# Patient Record
(demographics unavailable — no encounter records)

---

## 2022-08-21 NOTE — EDPHYS
Physician Documentation                                                                           

 [facility]                                                                 

Name: Natalie Guerrero                                                                                

Age: 66 yrs                                                                                       

Sex: Female                                                                                       

: 1955                                                                                   

MRN: [id number]                                                                                   

Arrival Date: 2022                                                                          

Time: 20:25                                                                                       

Account#: [id number]                                                                            

Bed 19                                                                                            

Private MD:                                                                                       

ED Physician Goyo Cardenas                                                                      

HPI:                                                                                              

                                                                                             

21:14 This 66 yrs old  Female presents to ER via Ambulatory with complaints of Nose  pj 

      Bleed.                                                                                      

21:14 The patient presents with a nose bleed, that is apparently anterior, from the right     pj 

      nare, occurred from an unknown cause, that is moderate amount causative factors             

      include: unknown. Onset: The symptoms/episode began/occurred just prior to arrival.         

      Modifying factors: The symptoms are alleviated by nothing. the symptoms are aggravated      

      by nothing. Associated signs and symptoms: The patient has no apparent associated signs     

      or symptoms. Severity of symptoms: At their worst the symptoms were moderate in the         

      emergency department the symptoms are unchanged. The patient has not experienced            

      similar symptoms in the past.                                                               

                                                                                                  

Historical:                                                                                       

- Allergies:                                                                                      

20:40 No Known Allergies;                                                                     bm7 

- PMHx:                                                                                           

20:40 Asthma; Hypertensive disorder; hiatal hernia;                                           bm7 

- PSHx:                                                                                           

20:40 None;                                                                                   bm7 

                                                                                                  

- Immunization history:: Adult Immunizations up to date, Client reports receiving the             

  2nd dose of the Covid vaccine, Client reports receiving the 1st dose of the Covid               

  vaccine.                                                                                        

- Social history:: Smoking status: Patient denies any tobacco usage or history of.                

                                                                                                  

                                                                                                  

ROS:                                                                                              

21:15 Constitutional: Negative for fever, chills, and weight loss, Eyes: Negative for injury, pj 

      pain, redness, and discharge, Neck: Negative for injury, pain, and swelling,                

      Cardiovascular: Negative for chest pain, palpitations, and edema, Abdomen/GI: Negative      

      for abdominal pain, nausea, vomiting, diarrhea, and constipation, Back: Negative for        

      injury and pain, : Negative for injury, bleeding, discharge, and swelling,                

      MS/Extremity: Negative for injury and deformity, Skin: Negative for injury, rash, and       

      discoloration, Neuro: Negative for headache, weakness, numbness, tingling, and seizure,     

      Psych: Negative for depression, anxiety, suicide ideation, homicidal ideation, and          

      hallucinations, Allergy/Immunology: Negative for hives, rash, and allergies, Endocrine:     

      Negative for neck swelling, polydipsia, polyuria, polyphagia, and marked weight             

      changes, Hematologic/Lymphatic: Negative for swollen nodes, abnormal bleeding, and          

      unusual bruising.                                                                           

21:15 ENT: Positive for nose bleed, sinus congestion.                                             

21:15 Respiratory: Positive for cough, shortness of breath.                                       

                                                                                                  

Exam:                                                                                             

21:15 Constitutional:  This is a well developed, well nourished patient who is awake, alert,  pj 

      and in no acute distress. Head/Face:  Normocephalic, atraumatic. Eyes:  Pupils equal        

      round and reactive to light, extra-ocular motions intact.  Lids and lashes normal.          

      Conjunctiva and sclera are non-icteric and not injected.  Cornea within normal limits.      

      Periorbital areas with no swelling, redness, or edema. Neck:  Trachea midline, no           

      thyromegaly or masses palpated, and no cervical lymphadenopathy.  Supple, full range of     

      motion without nuchal rigidity, or vertebral point tenderness.  No Meningismus.             

      Chest/axilla:  Normal chest wall appearance and motion.  Nontender with no deformity.       

      No lesions are appreciated. Cardiovascular:  Regular rate and rhythm with a normal S1       

      and S2.  No gallops, murmurs, or rubs.  Normal PMI, no JVD.  No pulse deficits.             

      Abdomen/GI:  Soft, non-tender, with normal bowel sounds.  No distension or tympany.  No     

      guarding or rebound.  No evidence of tenderness throughout. Back:  No spinal                

      tenderness.  No costovertebral tenderness.  Full range of motion. Female :  Normal        

      external genitalia. Skin:  Warm, dry with normal turgor.  Normal color with no rashes,      

      no lesions, and no evidence of cellulitis. MS/ Extremity:  Pulses equal, no cyanosis.       

      Neurovascular intact.  Full, normal range of motion. Neuro:  Awake and alert, GCS 15,       

      oriented to person, place, time, and situation.  Cranial nerves II-XII grossly intact.      

      Motor strength 5/5 in all extremities.  Sensory grossly intact.  Cerebellar exam            

      normal.  Normal gait. Psych:  Awake, alert, with orientation to person, place and time.     

       Behavior, mood, and affect are within normal limits.                                       

21:15 ENT: Nose: clotted blood, in right nare, Posterior pharynx: no acute changes, Airway:       

      normal, no evidence of obstruction.                                                         

21:15 Cardiovascular: Rate: normal, Rhythm: regular, Pulses: Pulses are 4+ in bilateral           

      radial, brachial, femoral, popliteal, posterior tibial and and dorsalis pedis               

      arteries.. Heart sounds: normal, normal S1and S2, no S3 or S4, no murmur, no rub, no        

      gallop, Edema: is not appreciated, JVD: is not appreciated.                                 

21:15 Respiratory: the patient does not display signs of respiratory distress,  Respirations:     

      normal, no acute changes, labored breathing, is not present, Breath sounds: rales, that     

      are moderate, are heard in the  right posterior middle lobe and right posterior lower       

      lobe, decreased breath sounds, that are mild, rhonchi, that are mild, are scattered,        

      stridor, is not appreciated, + upper airway congestion. Respiratory rate:  22               

23:18 ECG was reviewed by the Attending Physician.                                            [facility] 

                                                                                                  

Vital Signs:                                                                                      

20:38  / 80; Pulse 90; Resp 20; Temp 99.4(TE); Pulse Ox 94% on R/A; Weight 63.96 kg     bm7 

      (R); Height 5 ft. 6 in. (167.64 cm); Pain 0/10;                                             

23:35  / 82; Pulse 102; Resp 23; Pulse Ox 97% on R/A;                                   ja4 

20:38 Body Mass Index 22.76 (63.96 kg, 167.64 cm)                                             bm7 

                                                                                                  

Ivory Coma Score:                                                                               

22:50 Eye Response: spontaneous(4). Verbal Response: oriented(5). Motor Response: obeys       ja4 

      commands(6). Total: 15.                                                                     

                                                                                                  

MDM:                                                                                              

20:55 Patient medically screened.                                                             pj 

21:19 Differential diagnosis: nasal fracture, trauma, sinusitis, spontaneous epistaxis. Data  [facility] 

      reviewed: vital signs, nurses notes, lab test result(s), EKG, radiologic studies, CT        

      scan, plain films. Data interpreted: Cardiac monitor: rate is 90 beats/min, rhythm is       

      regular, Pulse oximetry: on room air is 94 %. Test interpretation: by ED physician or       

      midlevel provider: ECG, plain radiologic studies. Counseling: I had a detailed              

      discussion with the patient and/or guardian regarding: the historical points, exam          

      findings, and any diagnostic results supporting the discharge/admit diagnosis, the          

      presence of at least one elevated blood pressure reading (>120/80) during this              

      emergency department visit, lab results, radiology results.                                 

                                                                                                  

                                                                                             

21:14 Order name: Basic Metabolic Panel; Complete Time: 23:38                                 [facility] 

                                                                                             

21:14 Order name: CBC with Diff; Complete Time: 23:38                                         [facility] 

                                                                                             

21:14 Order name: LFT's; Complete Time: 23:38                                                 [facility] 

                                                                                             

21:14 Order name: Magnesium; Complete Time: 23:38                                             [facility] 

                                                                                             

21:14 Order name: NT PRO-BNP; Complete Time: 23:38                                            [facility] 

                                                                                             

21:14 Order name: PT-INR; Complete Time: 23:38                                                [facility] 

                                                                                             

21:14 Order name: Troponin HS; Complete Time: 23:38                                           [facility] 

                                                                                             

21:14 Order name: Blood Culture Adult (2)                                                     [facility] 

                                                                                             

21:14 Order name: Lactate; Complete Time: 23:38                                               [facility] 

                                                                                             

21:14 Order name: SARS RAPID; Complete Time: 23:38                                            [facility] 

                                                                                             

03:07 Order name: CBC with Automated Diff; Complete Time: 05:00                               [facility]

                                                                                             

03:42 Order name: ABO/RH no charge; Complete Time: 05:00                                      [facility]

                                                                                             

03:47 Order name: Manual Differential; Complete Time: 05:00                                   [facility]

                                                                                             

03:52 Order name: Basic Metabolic Panel; Complete Time: 05:00                                 [facility]

                                                                                             

21:14 Order name: XRAY Chest (1 view); Complete Time: 22:55                                   [facility] 

                                                                                             

21:14 Order name: EKG; Complete Time: 21:16                                                   [facility] 

                                                                                             

21:14 Order name: Cardiac monitoring; Complete Time: 23:22                                    [facility] 

                                                                                             

21:14 Order name: EKG - Nurse/Tech; Complete Time: 23:22                                      [facility] 

                                                                                             

21:14 Order name: IV Saline Lock; Complete Time: 23:22                                        [facility] 

                                                                                             

21:14 Order name: CT Chest For PE Angio                                                       pj 

                                                                                             

03:52 Order name: NT PRO-BNP; Complete Time: 05:00                                            [facility]

                                                                                             

04:55 Order name: Type and Screen; Complete Time: 05:00                                       [facility]

                                                                                             

21:14 Order name: Labs collected and sent; Complete Time: 23:22                               [facility] 

                                                                                             

21:14 Order name: O2 Per Protocol; Complete Time: 23:22                                       [facility] 

                                                                                             

21:14 Order name: O2 Sat Monitoring; Complete Time: 23:22                                     [facility] 

                                                                                                  

EC:18 Rate is 100 beats/min. Rhythm is regular. QRS Axis is Normal. RI interval is normal.    pj 

      QRS interval is normal. QT interval is normal. No Q waves. T waves are Normal. No ST        

      changes noted. Clinical impression: NSR w/ Non-specific ST/T Changes, LVH, and No           

      evidence of ischemia. Interpreted by me. Reviewed by me.                                    

                                                                                                  

Administered Medications:                                                                         

23:39 Discontinued: NS 0.9% 1000 ml IV at 125 ml/hr continuous                                pj 

22:06 Drug: Albuterol - atroVENT (ipratropium) (3:1) (2.5 mg - 0.5 mg) 3 ml Route: Nebulizer; 7 

23:21 Drug: levofloxacin 750 mg Volume: 150 ml; Route: IVPB; Infused Over: 90 mins; Site:     ja4 

      left wrist;                                                                                 

23:21 Drug: ProTONIX (pantoprazole) 40 mg Route: IVP; Site: left wrist;                       ja4 

23:21 Drug: SOLU-Medrol (methylPrednisoLONE) 125 mg Route: IVP; Site: left wrist;             ja4 

23:22 Drug: NS 0.9% 1000 ml Route: IV; Rate: 125 ml/hr; Site: left wrist;                     ja4 

                                                                                             

00:43 Drug: NS 0.9% with KCl 20 mEq/L 1000 ml Route: IV; Rate: 125 ml/hr; Site: left wrist;   ja4 

00:43 Drug: Mucomyst - Acetylcysteine 600 mg Route: PO;                                       ja4 

00:44 Drug: Potassium Effervescent Tablet 50 mEq Route: PO;                                   ja4 

                                                                                                  

                                                                                                  

Disposition Summary:                                                                              

22 22:30                                                                                    

Hospitalization Ordered                                                                           

      Hospitalization Status: Inpatient Admission                                             pj 

      Provider: Lesley Sequiera                                                                pj 

      Condition: Fair                                                                         pj 

      Problem: new                                                                            pj 

      Symptoms: have improved                                                                 pj 

      Bed/Room Type: Standard                                                                 pj 

      Location: [facility] ER HOLD(22 01:12)                                                    

      Room Assignment: ERHOLD-(22 01:12)                                                cg  

      Diagnosis                                                                                   

        - Epistaxis                                                                           pj 

        - Hemoptysis                                                                          pj 

        - COPD/ Chronic obstructive pulmonary disease, unspecified                            pj 

        - Hypoxemia                                                                           pj 

        - Congenital hiatus hernia                                                            pj 

        - Pneumonia due to other specified bacteria - left lower pna                          pj 

        - Hypokalemia                                                                         pj 

        - Unspecified kidney failure                                                          pj 

      Forms:                                                                                      

        - Medication Reconciliation Form                                                      pj 

        - SBAR form                                                                           pj 

Signatures:                                                                                       

Dispatcher [employer]                           EDGoyo Moody MD MD cha Waters, Shelly, FNP-C                   SHIMAP-Annie Meyers RN                       RN                                                      

Lucy Pruitt RN                  RN   7                                                  

Fidencio Lucero RN                       RN   ja4                                                  

                                                                                                  

Corrections: (The following items were deleted from the chart)                                    

                                                                                             

20:42 20:40 PMHx: None; chelsea tran 

                                                                                             

01:12  22:30 Telemetry/MedSurg (Inpatient) pj                                             

                                                                                             

01:12  22:30 [facility]  

                                                                                                  

************************************************************************************************** Problem: Activity Intolerance  Goal: # Tolerates activity for d/c setting with no clinical problems  PT/ OT orders implemented.

## 2022-08-21 NOTE — RAD REPORT
EXAM DESCRIPTION:  RAD - Chest Single View - 8/21/2022 10:23 pm

 

CLINICAL HISTORY:  Cough

 

COMPARISON:  None

 

TECHNIQUE:  AP portable chest image was obtained 8/21/2022 10:23 pm .

 

FINDINGS:  Patchy interstitial and alveolar opacities are present in the lower left lung field. In th
e acute clinical setting an early pneumonia is suspected. Patient has a prominent interstitial patter
n that believed to be baseline. Heart and vasculature are normal. No measurable pleural effusion and 
no pneumothorax. No acute bony abnormality seen. No acute aortic findings suspected.

 

IMPRESSION:  Patchy left base opacification suspicious for early pneumonia.

## 2022-08-21 NOTE — ER
Nurse's Notes                                                                                     

 [facility]                                                                 

Name: Natalie Guerrero                                                                                

Age: 66 yrs                                                                                       

Sex: Female                                                                                       

: 1955                                                                                   

MRN: [id number]                                                                                   

Arrival Date: 2022                                                                          

Time: 20:25                                                                                       

Account#: [id number]                                                                            

Bed 19                                                                                            

Private MD:                                                                                       

Diagnosis: Epistaxis;Hemoptysis;COPD/ Chronic obstructive pulmonary disease,                      

  unspecified;Hypoxemia;Congenital hiatus hernia;Pneumonia due to other specified                 

  bacteria-left lower pna;Hypokalemia;Unspecified kidney failure                                  

                                                                                                  

Presentation:                                                                                     

                                                                                             

20:38 Chief complaint: Patient states: I started having a nose bleed tonight and its going    bm7 

      down my throat irritating my bronchial asthma. Last time I had a nosebleed like this I      

      ended up having a bleeding ulcer. I also have a hiatal hernia that needs to be              

      repaired. Coronavirus screen: Client presents with at least one sign or symptom that        

      may indicate coronavirus-19. Standard/surgical mask placed on the client. Ebola Screen:     

      No symptoms or risks identified at this time. Initial Sepsis Screen: Does the patient       

      meet any 2 criteria? No. Patient's initial sepsis screen is negative. Does the patient      

      have a suspected source of infection? No. Patient's initial sepsis screen is negative.      

      Risk Assessment: Do you want to hurt yourself or someone else? Patient reports no           

      desire to harm self or others. Onset of symptoms was 2022.                       

20:38 Method Of Arrival: Ambulatory                                                           bm7 

20:38 Acuity: HADLEY 3                                                                           bm7 

                                                                                                  

Triage Assessment:                                                                                

20:40 General: Appears in no apparent distress. uncomfortable, Behavior is calm, cooperative, bm7 

      appropriate for age. Pain: Denies pain. EENT: Nares with bleeding noted. Neuro: No          

      deficits noted. Cardiovascular: No deficits noted. Respiratory: Reports shortness of        

      breath on exertion cough that is non-productive, pain with cough Airway is patent           

      Respiratory effort is even, unlabored, Respiratory pattern is regular, symmetrical,         

      Breath sounds are coarse bilaterally. GI: No deficits noted. No signs and/or symptoms       

      were reported involving the gastrointestinal system. : No deficits noted. No signs        

      and/or symptoms were reported regarding the genitourinary system. Derm: No deficits         

      noted. No signs and/or symptoms reported regarding the dermatologic system.                 

      Musculoskeletal: No deficits noted. No signs and/or symptoms reported regarding the         

      musculoskeletal system.                                                                     

                                                                                                  

Historical:                                                                                       

- Allergies:                                                                                      

20:40 No Known Allergies;                                                                     bm7 

- PMHx:                                                                                           

20:40 Asthma; Hypertensive disorder; hiatal hernia;                                           bm7 

- PSHx:                                                                                           

20:40 None;                                                                                   bm7 

                                                                                                  

- Immunization history:: Adult Immunizations up to date, Client reports receiving the             

  2nd dose of the Covid vaccine, Client reports receiving the 1st dose of the Covid               

  vaccine.                                                                                        

- Social history:: Smoking status: Patient denies any tobacco usage or history of.                

                                                                                                  

                                                                                                  

Screenin:50 Abuse screen: Denies threats or abuse. Nutritional screening: No deficits noted.        ja4 

      Tuberculosis screening: Never had TB. Possible symptoms: None. Fall Risk IV access (20      

      points).                                                                                    

                                                                                                  

Assessment:                                                                                       

22:50 General: Appears distressed, Behavior is cooperative, appropriate for age, anxious.     ja4 

      Neuro: No deficits noted. Respiratory: Airway is patent Respiratory effort is labored,      

      Respiratory pattern is symmetrical, tachypnea Sputum is.                                    

                                                                                                  

Vital Signs:                                                                                      

20:38  / 80; Pulse 90; Resp 20; Temp 99.4(TE); Pulse Ox 94% on R/A; Weight 63.96 kg     bm7 

      (R); Height 5 ft. 6 in. (167.64 cm); Pain 0/10;                                             

23:35  / 82; Pulse 102; Resp 23; Pulse Ox 97% on R/A;                                   ja4 

20:38 Body Mass Index 22.76 (63.96 kg, 167.64 cm)                                             bm7 

                                                                                                  

Vitals:                                                                                           

22:50 Cardiac Rhythm Assessment Sinus arrythmia.                                              ja4 

                                                                                                  

Ivory Coma Score:                                                                               

22:50 Eye Response: spontaneous(4). Verbal Response: oriented(5). Motor Response: obeys       ja4 

      commands(6). Total: 15.                                                                     

                                                                                                  

ED Course:                                                                                        

20:25 Patient arrived in ED.                                                                  as  

20:40 Triage completed.                                                                       bm7 

20:40 Arm band placed on right wrist.                                                         bm7 

20:55 Goyo Cardenas MD is Attending Physician.                                             pj 

22:11 Katelin Tolentino RN is Primary Nurse.                                                 ke1 

22:25 XRAY Chest (1 view) In Process Unspecified.                                             EDMS

22:27 Lseley Sequeira MD is Hospitalizing Provider.                                           pj 

22:50 Patient has correct armband on for positive identification. Bed in low position. Call   ja4 

      light in reach. Side rails up X 1.                                                          

22:50 No provider procedures requiring assistance completed. Inserted saline lock: 20 gauge   ja4 

      in left wrist, using aseptic technique.                                                     

23:22 Basic Metabolic Panel Sent.                                                              

23:22 LFT's Sent.                                                                             4 

23:22 Magnesium Sent.                                                                         4 

23:22 NT PRO-BNP Sent.                                                                         

23: Troponin HS Sent.                                                                        

                                                                                             

00:19 CT Chest For PE Angio In Process Unspecified.                                           EDMS

14:21 IV discontinued, intact, bleeding controlled, No redness/swelling at site. Pressure     mb8 

      dressing applied.                                                                           

                                                                                                  

Administered Medications:                                                                         

                                                                                             

23:39 Discontinued: NS 0.9% 1000 ml IV at 125 ml/hr continuous                                [facility] 

22:06 Drug: Albuterol - atroVENT (ipratropium) (3:1) (2.5 mg - 0.5 mg) 3 ml Route: Nebulizer; [facility] 

23:21 Drug: levofloxacin 750 mg Volume: 150 ml; Route: IVPB; Infused Over: 90 mins; Site:     ja4 

      left wrist;                                                                                 

23:21 Drug: ProTONIX (pantoprazole) 40 mg Route: IVP; Site: left wrist;                        

23:21 Drug: SOLU-Medrol (methylPrednisoLONE) 125 mg Route: IVP; Site: left wrist;             4 

23:22 Drug: NS 0.9% 1000 ml Route: IV; Rate: 125 ml/hr; Site: left wrist;                     4 

                                                                                             

00:43 Drug: NS 0.9% with KCl 20 mEq/L 1000 ml Route: IV; Rate: 125 ml/hr; Site: left wrist;   ja4 

00:43 Drug: Mucomyst - Acetylcysteine 600 mg Route: PO;                                       4 

00:44 Drug: Potassium Effervescent Tablet 50 mEq Route: PO;                                   4 

                                                                                                  

                                                                                                  

Medication:                                                                                       

                                                                                             

22:50 VIS not applicable for this client.                                                     ja4 

                                                                                                  

Outcome:                                                                                          

22:30 Decision to Hospitalize by Provider.                                                    [facility] 

                                                                                             

01:48 Admitted to ER Hold.  Please see [facility] for further documentation.                    [facility] 

14:21 Condition: stable                                                                       mb8 

14:21 Patient left the ED.                                                                    mb8 

                                                                                                  

Signatures:                                                                                       

Dispatcher [employer]                           EDMS                                                 

Goyo Cardenas MD MD cha Martinez, Amelia as McCarthy, Brittany, RN                  RN   bm7                                                  

Katelin Tolentino RN                   RN   ke1                                                  

Nic, Fidencio, RN                       RN   ja4                                                  

Russ Good RN                      RN   mb8                                                  

                                                                                                  

Corrections: (The following items were deleted from the chart)                                    

                                                                                             

20:42 20:40 PMHx: None; bm7                                                                   bm7 

                                                                                                  

**************************************************************************************************

## 2022-08-22 NOTE — RAD REPORT
EXAM DESCRIPTION:  CT - Chest For Pe Angio - 8/22/2022 6:50 am

 

CLINICAL HISTORY:  Hemoptysis.

 

COMPARISON:  Portable.

 

TECHNIQUE:  CTA of the chest was performed following intravenous administration of iodinated contrast
. Axial soft tissue and lung window, and coronal and sagittal soft tissue window reconstructions were
 created and sent to PACS.

3D postprocessing was performed on an independent workstation, with images sent to PACS for subsequen
t review.

This exam was performed according to our departmental dose-optimization program, which includes autom
ated exposure control, adjustment of the mA and/or kV according to patient size and/or use of iterati
ve reconstruction technique.

 

FINDINGS:  Vascular: Slightly suboptimal contrast timing for evaluation of the pulmonary arteries. Mo
derate streak artifact in the right main pulmonary trunk and right upper lobe pulmonary arteries due 
to contrast in the adjacent SVC. No CT evidence of acute pulmonary thromboembolism within these limit
ations. No evidence of aortic aneurysm or dissection.

Lungs and pleura: Multifocal small groundglass opacities with associated interstitial thickening thro
ughout both lungs. No pleural effusion. No pneumothorax. Mild diffuse bronchial wall thickening.

Mediastinum and neck: Mild mediastinal and bilateral hilar lymphadenopathy. Unremarkable appearance o
f the thyroid gland.

Cardiac: No cardiomegaly or pericardial effusion.

Abdomen: Tiny sliding-type hiatal hernia. Borderline hepatic steatosis. Incidentally noted splenic ar
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

Musculoskeletal: No concerning osseous abnormality.

 

IMPRESSION:  1.   No CTA evidence of acute pulmonary thromboembolism, within the limitations of this 
exam.

2.   Multifocal small pulmonary opacities concerning for infectious/inflammatory process.

3.   Recommend follow-up chest CT to ensure resolution.

 

Electronically signed by:   Yolanda Scherer MD   8/22/2022 12:34 AM CDT Workstation: [id number]

 

 

 

Due to temporary technical issues with the PACS/Fluency reporting system, reports are being signed by
 the in house radiologists without review as a courtesy to insure prompt reporting. The interpreting 
radiologist is fully responsible for the content of the report

## 2022-08-22 NOTE — P.HP
Certification for Inpatient


Patient admitted to: Inpatient


With expected LOS: >2 Midnights


Patient will require the following post-hospital care: None


Practitioner: I am a practitioner with admitting privileges, knowledge of 

patient current condition, hospital course, and medical plan of care.


Services: Services provided to patient in accordance with Admission requirements

found in Title 42 Section 412.3 of the Code of Federal Regulations





<Maryam Lima - Last Filed: 08/22/22 03:30>





Patient History


Date of Service: 08/22/22


Primary Care Provider: Dr. Saint Yang at [facility]


Reason for admission: Ms. Guerrero is a 67yo female who presents to 

with epistaxis 


History of Present Illness: 


Ms. Guerrero is a 67yo female who presents to  with epistaxis and 

hemoptysis. One year ago Ms. Guerrero was seen at [facility] by Dr. Martins 

(GI) for hematemesis and anemia secondary to an esophageal ulcer. She was 

diagnosed at that time with a hiatal hernia. In process for this surgical 

procedure she has had frequent endoscopic procedures and four months ago was 

declared ready for the hiatal hernia repair. All bleeding had stopped according 

to pt history. Ms. Guerrero needed cardiology clearance and saw Dr. Gannon 

(Cardiology). She received cardiology clearance last week. Ms. Guerrero became 

short of breath with inspiratory and expiratory wheezing and was seen again at 

[facility] by Dr. Desir (Pulmonology) on this past Friday. She was 

diagnosed with severe bronchial asthma and prescriptions for inhalers, nebulizer

treatments, and antibiotics were sent to her pharmacy. Ms. Guerrero states the 

pharmacy made a mistake and her medications were not filled. She arrives today 

in the ED with existaxis and hemoptysis. She was diagnosed in the ED with 

pneumonia and given Levaquin. CT negative for PE but positive for multifocal 

small pulmonary opacities. Transfer to [facility] for continuity of care 

was attempted. Transfer declined second to capacity.





Home medications list reviewed: Yes (Pt takes Adderall, HCTZ, Mucinex DM. She w

eaned herself from Prozac. )





- Past Medical/Surgical History


Has patient received pneumonia vaccine in the past: No


Diabetic: No


-: asthma


-: esophageal ulcer


-: hiatal hernia





<Maryam Lima - Last Filed: 08/22/22 03:30>


Date of Service: 08/27/22





- Past Medical/Surgical History


Past Surgical History: Patient denies surgical history





- Family History


Family History: Reviewed- Non-Contributory





- Social History


Smoking Status: Never smoker


Alcohol use: No


Place of Residence: Home





<Sterling Qureshi - Last Filed: 08/27/22 15:03>


Allergies





No Known Allergies Allergy (Unverified 08/22/22 01:34)


   





Home Medications: 








predniSONE [Deltasone*] 10 mg PO BID 10 Days #20 tab 08/22/22 








Review of Systems


General: Malaise


Eyes: Unremarkable


ENT: Unremarkable


Respiratory: Cough, Shortness of Breath, Hemoptysis, Wheezing


Cardiovascular: Unremarkable


Gastrointestinal: Nausea


Genitourinary: Unremarkable


Musculoskeletal: Unremarkable


Integumentary: Unremarkable


Neurological: Unremarkable


Lymphatics: Unremarkable





<Maryam Lima - Last Filed: 08/22/22 03:30>





Physical Examination





- Physical Exam


General: Alert, In no apparent distress, Oriented x3


HEENT: Atraumatic, Normocephalic


Neck: Supple, 2+ carotid pulse no bruit


Respiratory: Crackles/rales


Cardiovascular: No edema, Normal pulses, Systolic murmur


Capillary refill: <2 Seconds


Gastrointestinal: Normal bowel sounds, No tenderness


Musculoskeletal: No clubbing, No swelling


Integumentary: No rashes, No breakdown


Neurological: Normal speech, Normal strength at 5/5 x4 extr


Lymphatics: No axilla or inguinal lymphadenopathy





- Studies


Laboratory Data (last 24 hrs)





08/21/22 22:50: PT 13.2 H, INR 1.20


08/21/22 22:50: WBC 9.40, Hgb 9.9 L, Hct 30.2 L, Plt Count 300


08/21/22 22:50: Sodium 133 L, Potassium 2.8 L*, BUN 27 H, Creatinine 1.36 H, 

Glucose 99, Magnesium 1.9, Total Bilirubin 0.2, AST 21, ALT 18, Alkaline 

Phosphatase 80








<Maryam Lima - Last Filed: 08/22/22 03:30>





- Studies


Microbiology Data (last 24 hrs): 








08/21/22 22:55   Blood  - Blood   Aerobic Blood Culture - Final


                            No growth in 5 days.


08/21/22 22:55   Blood  - Blood   Anaerobic Blood Culture - Final


                            No growth in 5 days.


08/21/22 22:50   Blood  - Blood   Aerobic Blood Culture - Final


                            No growth in 5 days.


08/21/22 22:50   Blood  - Blood   Anaerobic Blood Culture - Final


                            No growth in 5 days.








<Sterling Qureshi - Last Filed: 08/27/22 15:03>





Assessment and Plan





- Problems (Diagnosis)


(1) Pneumonia


Status: Acute   


Plan: 


Scheduled neb treatments, will try to avoid steroids secondary to GIB history, 

broad spectrum antibiotics. Covid negative.


Qualifiers: 


   Laterality: bilateral   Lung location: unspecified part of lung 





(2) Epistaxis


Status: Acute   


Plan: 


Avoid nasal trauma, soft nasal packing with clamp and ice to bridge of nose prn 

bleeding, avoid nasal steroids. Moisturize nares prn. Will re-eval H/H in the 

am. 





Discharge Plan: Home


Plan to discharge in: 72 Hours





- Advance Directives


Does patient have a Living Will: No


Does patient have a Durable POA for Healthcare: No





- Code Status/Comfort Care


Code Status Assessed: Yes (Full)





<Maryam Lima - Last Filed: 08/22/22 03:30>


Time Spent Managing Pts Care (In Minutes): 70





<Sterling Qureshi - Last Filed: 08/27/22 15:03>

## 2022-08-22 NOTE — P.CNS
Date of Consult: 08/22/22


Reason for Consult: Shortness of breath and epistaxis


Primary Care Provider: Dr. Saint Yang at [facility]


Chief Complaint: Ms. Guerrero is a 67yo female who presents to [facility] with

epistaxis 


History of Present Illness: 


Patient is 66 years old admitted with epistaxis and is she was recently 

diagnosed with asthma did not receive her medications get her to undergo 

operation for a hiatal hernia patient had numerous endoscopies with a history of

GI bleed received a cardiology clearance denies any fever or chills and was 

using Advair at home the scan shows bilateral opacities





Allergies





No Known Allergies Allergy (Unverified 08/22/22 01:34)


   








- Past Medical/Surgical History


Diabetic: No


-: asthma


-: esophageal ulcer


-: hiatal hernia





Review of Systems


10-point ROS is otherwise unremarkable





Physical Examination














Temp Pulse Resp BP Pulse Ox


 


    86   16   136/90   97 


 


    08/22/22 07:26  08/22/22 07:26  08/22/22 07:26  08/22/22 07:26








General: Alert, In no apparent distress, Oriented x3


Respiratory: Clear to auscultation bilaterally


Cardiovascular: No edema, Normal pulses, Regular rate/rhythm


Gastrointestinal: Normal bowel sounds, Soft and benign


Laboratory Data (last 24 hrs)





08/21/22 22:50: PT 13.2 H, INR 1.20


08/21/22 22:50: WBC 9.40, Hgb 9.9 L, Hct 30.2 L, Plt Count 300


08/21/22 22:50: Sodium 133 L, Potassium 2.8 L*, BUN 27 H, Creatinine 1.36 H, 

Glucose 99, Magnesium 1.9, Total Bilirubin 0.2, AST 21, ALT 18, Alkaline 

Phosphatase 80








- Problems


(1) Pneumonia


Current Visit: Yes   Status: Acute   


Plan: 


Age 66 admitted with epistaxis CT scan shows bilateral patchy changes 

predominantly subcu pleural on the left side patient has mild microcytic anemia 

and hypokalemia vital signs oxygenation all stable patient is stable for 

discharge on low-dose prednisone continue with Advair we will add levofloxacin 

for atypical coverage


She is so far she has not had any further epistaxis stable for discharge

## 2022-08-22 NOTE — EKG
Test Date:    2022-08-21               Test Time:    23:00:06

Technician:   VASQUEZ                                     

                                                     

MEASUREMENT RESULTS:                                       

Intervals:                                           

Rate:         100                                    

ME:           146                                    

QRSD:         86                                     

QT:           392                                    

QTc:          505                                    

Axis:                                                

P:            23                                     

ME:           146                                    

QRS:          63                                     

T:            60                                     

                                                     

INTERPRETIVE STATEMENTS:                                       

                                                     

Normal sinus rhythm

Possible Left atrial enlargement

Left ventricular hypertrophy

Prolonged QT

Abnormal ECG

No previous ECG available for comparison



Electronically Signed On 08-22-22 08:06:08 CDT by Anson Guardado

## 2022-08-29 NOTE — P.DS
Admission Date: 08/22/22


Discharge Date: 08/22/22


Primary Care Provider: Dr. Saint Yang at [facility]


Disposition: ROUTINE DISCHARGE


Discharge Condition: GOOD


Reason for Admission: Ms. Guerrero is a 67yo female who presents to [facility]

with epistaxis 


Consultations: 





Pulmonology - Dr. Rivera





Brief History of Present Illness: 





67yo female who presents to [facility] with epistaxis and hemoptysis. One 

year ago Ms. Guerrero was seen at [facility] by Dr. Martins (GI) for 

hematemesis and anemia secondary to an esophageal ulcer. She was diagnosed at 

that time with a hiatal hernia. In process for this surgical procedure she has 

had frequent endoscopic procedures and four months ago was declared ready for 

the hiatal hernia repair. All bleeding had stopped according to pt history. Ms. Guerrero needed cardiology clearance and saw Dr. Gannon (Cardiology). She received 

cardiology clearance last week. Ms. Guerrero became short of breath with 

inspiratory and expiratory wheezing and was seen again at [facility] by 

Dr. Desir (Pulmonology) on this past Friday. She was diagnosed with severe 

bronchial asthma and prescriptions for inhalers, nebulizer treatments, and 

antibiotics were sent to her pharmacy. Ms. Guerrero states the pharmacy made a 

mistake and her medications were not filled. She arrives today in the ED with 

existaxis and hemoptysis. She was diagnosed in the ED with pneumonia and given 

Levaquin. CT negative for PE but positive for multifocal small pulmonary 

opacities. Transfer to [facility] for continuity of care was attempted. 

Transfer declined second to capacity.





Hospital Course: 





Problem List


Epistaxis


Community-acuired pneumonia


Asthma








Patient presented with epistaxis and cough. Found to have asthma with some small

opacities in bilateral lung bases.


Patient's bleeding stopped with holding pressure. Pulmonology was consulted and 

deemed patient stable for discharge home.


She is to continue Levaquin as prescribed by her pulmonologist - confirmed ready

at pharmacy prior to discharge.


She is also prescribed prednisone and duonebs with nebulizer, which will be de

livered and taught to her at home.





Follow up with PCP within 1 week


Follow up with Pulmonology in ~1 week.





If bleeding occurs again, recommend holding pressure.


Can try afrin nasal spray - 2 sprays in each nostril one time and hold pressure.





Vital Signs/Physical Exam: 














Temp Pulse Resp BP Pulse Ox


 


 99.4 F   102 H  23 H  149/82 H  97 


 


 08/22/22 14:40  08/22/22 14:42  08/22/22 14:42  08/22/22 14:42  08/22/22 12:00








General: Alert, In no apparent distress, Oriented x3


HEENT: EOMI, Sclerae nonicteric


Respiratory: Clear to auscultation bilaterally, Normal air movement


Cardiovascular: No edema, Regular rate/rhythm


Gastrointestinal: Soft and benign, Non-distended, No tenderness


Musculoskeletal: No contractures, No tenderness


Integumentary: No rashes, No significant lesion


Neurological: Normal speech, Normal affect


Laboratory Data at Discharge: 














WBC  8.10 K/uL (4.3-10.9)   08/22/22  02:16    


 


Hgb  9.4 g/dL (12.0-15.0)  L  08/22/22  02:16    


 


Hct  28.2 % (36.0-45.0)  L  08/22/22  02:16    


 


Plt Count  276 K/uL (152-406)   08/22/22  02:16    


 


PT  13.2 SECONDS (9.5-12.5)  H  08/21/22  22:50    


 


INR  1.20   08/21/22  22:50    


 


Sodium  133 mmol/L (136-145)  L  08/22/22  02:16    


 


Potassium  4.1 mmol/L (3.5-5.1)   08/22/22  02:16    


 


BUN  23 mg/dL (7-18)  H  08/22/22  02:16    


 


Creatinine  1.26 mg/dL (0.55-1.3)   08/22/22  02:16    


 


Glucose  133 mg/dL ()  H  08/22/22  02:16    


 


Magnesium  1.9 mg/dL (1.8-2.4)   08/21/22  22:50    


 


Total Bilirubin  0.2 mg/dL (0.2-1.0)   08/21/22  22:50    


 


AST  21 U/L (15-37)   08/21/22  22:50    


 


ALT  18 U/L (12-78)   08/21/22  22:50    


 


Alkaline Phosphatase  80 U/L ()   08/21/22  22:50    








Home Medications: 








predniSONE [Deltasone*] 10 mg PO BID 10 Days #20 tab 08/22/22 





New Medications: 


predniSONE [Deltasone*] 10 mg PO BID 10 Days #20 tab


Physician Discharge Instructions: 


Patient presented with epistaxis and cough. Found to have asthma with some small

 opacities in bilateral lung bases.


Patient's bleeding stopped with holding pressure. Pulmonology was consulted and 

deemed patient stable for discharge home.


She is to continue Levaquin as prescribed by her pulmonologist - confirmed ready

 at pharmacy prior to discharge.


She is also prescribed prednisone and duonebs with nebulizer, which will be 

delivered and taught to her at home.





Follow up with PCP within 1 week


Follow up with Pulmonology in ~1 week.





If bleeding occurs again, recommend holding pressure.


Can try afrin nasal spray - 2 sprays in each nostril one time and hold pressure.





Followup: 


OOT,OOT [Primary Care Provider] - 


Time spent managing pt's care (in minutes): 40